# Patient Record
Sex: MALE | Race: ASIAN | NOT HISPANIC OR LATINO | URBAN - METROPOLITAN AREA
[De-identification: names, ages, dates, MRNs, and addresses within clinical notes are randomized per-mention and may not be internally consistent; named-entity substitution may affect disease eponyms.]

---

## 2018-11-28 ENCOUNTER — OFFICE VISIT CONVERTED (OUTPATIENT)
Dept: FAMILY MEDICINE CLINIC | Facility: CLINIC | Age: 53
End: 2018-11-28
Attending: NURSE PRACTITIONER

## 2021-05-16 VITALS
TEMPERATURE: 99.1 F | HEART RATE: 79 BPM | HEIGHT: 70 IN | BODY MASS INDEX: 26.4 KG/M2 | WEIGHT: 184.37 LBS | DIASTOLIC BLOOD PRESSURE: 72 MMHG | SYSTOLIC BLOOD PRESSURE: 116 MMHG | OXYGEN SATURATION: 95 %

## 2025-01-16 ENCOUNTER — APPOINTMENT (OUTPATIENT)
Dept: GENERAL RADIOLOGY | Facility: HOSPITAL | Age: 60
End: 2025-01-16
Payer: OTHER GOVERNMENT

## 2025-01-16 ENCOUNTER — HOSPITAL ENCOUNTER (EMERGENCY)
Facility: HOSPITAL | Age: 60
Discharge: HOME | End: 2025-01-16
Attending: EMERGENCY MEDICINE
Payer: OTHER GOVERNMENT

## 2025-01-16 VITALS
SYSTOLIC BLOOD PRESSURE: 130 MMHG | BODY MASS INDEX: 25.03 KG/M2 | HEIGHT: 70 IN | HEART RATE: 61 BPM | TEMPERATURE: 98 F | DIASTOLIC BLOOD PRESSURE: 80 MMHG | RESPIRATION RATE: 20 BRPM | OXYGEN SATURATION: 99 % | WEIGHT: 174.82 LBS

## 2025-01-16 DIAGNOSIS — R07.89 ATYPICAL CHEST PAIN: ICD-10-CM

## 2025-01-16 DIAGNOSIS — R94.31 ABNORMAL EKG: Primary | ICD-10-CM

## 2025-01-16 LAB
ALBUMIN SERPL-MCNC: 4.5 G/DL (ref 3.5–5.2)
ALBUMIN/GLOB SERPL: 1.9 G/DL
ALP SERPL-CCNC: 50 U/L (ref 39–117)
ALT SERPL W P-5'-P-CCNC: 24 U/L (ref 1–41)
ANION GAP SERPL CALCULATED.3IONS-SCNC: 9.6 MMOL/L (ref 5–15)
AST SERPL-CCNC: 25 U/L (ref 1–40)
BASOPHILS # BLD AUTO: 0.03 10*3/MM3 (ref 0–0.2)
BASOPHILS NFR BLD AUTO: 0.6 % (ref 0–1.5)
BILIRUB SERPL-MCNC: 0.4 MG/DL (ref 0–1.2)
BUN SERPL-MCNC: 18 MG/DL (ref 6–20)
BUN/CREAT SERPL: 17.3 (ref 7–25)
CALCIUM SPEC-SCNC: 9.3 MG/DL (ref 8.6–10.5)
CHLORIDE SERPL-SCNC: 104 MMOL/L (ref 98–107)
CO2 SERPL-SCNC: 29.4 MMOL/L (ref 22–29)
CREAT SERPL-MCNC: 1.04 MG/DL (ref 0.76–1.27)
DEPRECATED RDW RBC AUTO: 40.6 FL (ref 37–54)
EGFRCR SERPLBLD CKD-EPI 2021: 83.2 ML/MIN/1.73
EOSINOPHIL # BLD AUTO: 0.08 10*3/MM3 (ref 0–0.4)
EOSINOPHIL NFR BLD AUTO: 1.6 % (ref 0.3–6.2)
ERYTHROCYTE [DISTWIDTH] IN BLOOD BY AUTOMATED COUNT: 11.7 % (ref 12.3–15.4)
GEN 5 1HR TROPONIN T REFLEX: 7 NG/L
GLOBULIN UR ELPH-MCNC: 2.4 GM/DL
GLUCOSE SERPL-MCNC: 123 MG/DL (ref 65–99)
HCT VFR BLD AUTO: 42.6 % (ref 37.5–51)
HGB BLD-MCNC: 13.9 G/DL (ref 13–17.7)
HOLD SPECIMEN: NORMAL
HOLD SPECIMEN: NORMAL
IMM GRANULOCYTES # BLD AUTO: 0.01 10*3/MM3 (ref 0–0.05)
IMM GRANULOCYTES NFR BLD AUTO: 0.2 % (ref 0–0.5)
LIPASE SERPL-CCNC: 40 U/L (ref 13–60)
LYMPHOCYTES # BLD AUTO: 1.65 10*3/MM3 (ref 0.7–3.1)
LYMPHOCYTES NFR BLD AUTO: 32.7 % (ref 19.6–45.3)
MAGNESIUM SERPL-MCNC: 2.4 MG/DL (ref 1.6–2.6)
MCH RBC QN AUTO: 30.5 PG (ref 26.6–33)
MCHC RBC AUTO-ENTMCNC: 32.6 G/DL (ref 31.5–35.7)
MCV RBC AUTO: 93.6 FL (ref 79–97)
MONOCYTES # BLD AUTO: 0.43 10*3/MM3 (ref 0.1–0.9)
MONOCYTES NFR BLD AUTO: 8.5 % (ref 5–12)
NEUTROPHILS NFR BLD AUTO: 2.85 10*3/MM3 (ref 1.7–7)
NEUTROPHILS NFR BLD AUTO: 56.4 % (ref 42.7–76)
NRBC BLD AUTO-RTO: 0 /100 WBC (ref 0–0.2)
NT-PROBNP SERPL-MCNC: 45.9 PG/ML (ref 0–900)
PLATELET # BLD AUTO: 183 10*3/MM3 (ref 140–450)
PMV BLD AUTO: 10.5 FL (ref 6–12)
POTASSIUM SERPL-SCNC: 4.4 MMOL/L (ref 3.5–5.2)
PROT SERPL-MCNC: 6.9 G/DL (ref 6–8.5)
QT INTERVAL: 424 MS
QT INTERVAL: 477 MS
QTC INTERVAL: 425 MS
QTC INTERVAL: 437 MS
RBC # BLD AUTO: 4.55 10*6/MM3 (ref 4.14–5.8)
SODIUM SERPL-SCNC: 143 MMOL/L (ref 136–145)
TROPONIN T NUMERIC DELTA: -1 NG/L
TROPONIN T SERPL HS-MCNC: 8 NG/L
WBC NRBC COR # BLD AUTO: 5.05 10*3/MM3 (ref 3.4–10.8)
WHOLE BLOOD HOLD COAG: NORMAL
WHOLE BLOOD HOLD SPECIMEN: NORMAL

## 2025-01-16 PROCEDURE — 36415 COLL VENOUS BLD VENIPUNCTURE: CPT | Performed by: EMERGENCY MEDICINE

## 2025-01-16 PROCEDURE — 83690 ASSAY OF LIPASE: CPT | Performed by: EMERGENCY MEDICINE

## 2025-01-16 PROCEDURE — 93005 ELECTROCARDIOGRAM TRACING: CPT

## 2025-01-16 PROCEDURE — 99284 EMERGENCY DEPT VISIT MOD MDM: CPT

## 2025-01-16 PROCEDURE — 83735 ASSAY OF MAGNESIUM: CPT | Performed by: EMERGENCY MEDICINE

## 2025-01-16 PROCEDURE — 93005 ELECTROCARDIOGRAM TRACING: CPT | Performed by: EMERGENCY MEDICINE

## 2025-01-16 PROCEDURE — 80053 COMPREHEN METABOLIC PANEL: CPT | Performed by: EMERGENCY MEDICINE

## 2025-01-16 PROCEDURE — 84484 ASSAY OF TROPONIN QUANT: CPT | Performed by: EMERGENCY MEDICINE

## 2025-01-16 PROCEDURE — 83880 ASSAY OF NATRIURETIC PEPTIDE: CPT | Performed by: EMERGENCY MEDICINE

## 2025-01-16 PROCEDURE — 71045 X-RAY EXAM CHEST 1 VIEW: CPT

## 2025-01-16 PROCEDURE — 85025 COMPLETE CBC W/AUTO DIFF WBC: CPT | Performed by: EMERGENCY MEDICINE

## 2025-01-16 RX ORDER — SODIUM CHLORIDE 0.9 % (FLUSH) 0.9 %
10 SYRINGE (ML) INJECTION AS NEEDED
Status: DISCONTINUED | OUTPATIENT
Start: 2025-01-16 | End: 2025-01-16

## 2025-01-16 RX ORDER — ASPIRIN 81 MG/1
324 TABLET, CHEWABLE ORAL ONCE
Status: DISCONTINUED | OUTPATIENT
Start: 2025-01-16 | End: 2025-01-16

## 2025-01-17 NOTE — ED PROVIDER NOTES
Time: 7:01 PM EST  Date of encounter:  1/16/2025  Independent Historian/Clinical History and Information was obtained by:   Patient    History is limited by: N/A    Chief Complaint   Patient presents with    Chest Pain         History of Present Illness:  Patient is a 59 y.o. year old male who presents to the emergency department for evaluation of intermittent chest pain that has been going on all day.  He states that he has had some slight dizziness.  He denies shortness of breath, nausea, diaphoresis.       Patient Care Team  Primary Care Provider: Jackelyn Pedersen MD    Past Medical History:     Allergies   Allergen Reactions    Sulfamethoxazole-Trimethoprim Other (See Comments)     Pt states he turns red      No past medical history on file.  Past Surgical History:   Procedure Laterality Date    HERNIA REPAIR  2009    JOINT REPLACEMENT      Hips     No family history on file.    Home Medications:  Prior to Admission medications    Medication Sig Start Date End Date Taking? Authorizing Provider   aspirin 81 MG EC tablet aspirin 81 mg oral tablet,delayed release (DR/EC) take 1 tablet (81 mg) by oral route once daily   Active    ProviderKimberlee MD   atorvastatin (LIPITOR) 20 MG tablet  5/31/24   ProviderKimberlee MD   Glucosamine Sulfate 500 MG tablet Glucosamine 500 mg oral tablet take 3 tablets by oral route daily   Suspended    ProviderKimberlee MD   methylPREDNISolone (MEDROL) 4 MG dose pack Take as directed on package instructions.  Start 8/29/2024. 8/28/24   Nadira Gaytan APRN        Social History:   Social History     Tobacco Use    Smoking status: Never     Passive exposure: Never    Smokeless tobacco: Never   Vaping Use    Vaping status: Never Used   Substance Use Topics    Alcohol use: Not Currently         Review of Systems:  Review of Systems   Cardiovascular:  Positive for chest pain.        Physical Exam:  /80 (BP Location: Left arm, Patient Position: Sitting)   Pulse  "61   Temp 98 °F (36.7 °C) (Oral)   Resp 20   Ht 177.8 cm (70\")   Wt 79.3 kg (174 lb 13.2 oz)   SpO2 99%   BMI 25.08 kg/m²         Physical Exam  Vitals and nursing note reviewed.   Constitutional:       Appearance: Normal appearance.   HENT:      Head: Normocephalic.   Eyes:      Extraocular Movements: Extraocular movements intact.      Conjunctiva/sclera: Conjunctivae normal.   Cardiovascular:      Rate and Rhythm: Regular rhythm. Bradycardia present.      Heart sounds: Murmur heard.   Pulmonary:      Effort: Pulmonary effort is normal.   Abdominal:      General: There is no distension.   Skin:     General: Skin is warm.      Coloration: Skin is not cyanotic.   Neurological:      Mental Status: He is alert and oriented to person, place, and time.   Psychiatric:         Attention and Perception: Attention and perception normal.         Mood and Affect: Mood normal.                            Medical Decision Making:      Comorbidities that affect care:    None    External Notes reviewed:    Previous Clinic Note: Previous clinic note on 8/28/2024 reviewed      The following orders were placed and all results were independently analyzed by me:  Orders Placed This Encounter   Procedures    XR Chest 1 View    [place] Draw    High Sensitivity Troponin T    Comprehensive Metabolic Panel    Lipase    BNP    Magnesium    CBC Auto Differential    High Sensitivity Troponin T 1Hr    Ambulatory Referral to Cardiology    Continuous Pulse Oximetry    ECG 12 Lead Chest Pain    ECG 12 Lead Chest Pain    ECG 12 Lead    CBC & Differential    Green Top (Gel)    Lavender Top    Gold Top - SST    Light Blue Top       Medications Given in the Emergency Department:  Medications - No data to display     ED Course:    The patient was initially evaluated in the triage area where orders were placed. The patient was later dispositioned by Zach Stiles PA-C.      The patient was advised to stay for completion of workup which " includes but is not limited to communication of labs and radiological results, reassessment and plan. The patient was advised that leaving prior to disposition by a provider could result in critical findings that are not communicated to the patient.     ED Course as of 01/17/25 0201   u Jan 16, 2025 2233 XR Chest 1 View [CB]      ED Course User Index  [CB] Zach Stiles, ANAIS       Labs:    Lab Results (last 24 hours)       Procedure Component Value Units Date/Time    High Sensitivity Troponin T [[id number]]  (Normal) Collected: 01/16/25 1907    Specimen: Blood from Arm, Right Updated: 01/16/25 1941     HS Troponin T 8 ng/L     Narrative:      High Sensitive Troponin T Reference Range:  <14.0 ng/L- Negative Female for AMI  <22.0 ng/L- Negative Male for AMI  >=14 - Abnormal Female indicating possible myocardial injury.  >=22 - Abnormal Male indicating possible myocardial injury.   Clinicians would have to utilize clinical acumen, EKG, Troponin, and serial changes to determine if it is an Acute Myocardial Infarction or myocardial injury due to an underlying chronic condition.         CBC & Differential [[id number]]  (Abnormal) Collected: 01/16/25 1907    Specimen: Blood from Arm, Right Updated: 01/16/25 1913    Narrative:      The following orders were created for panel order CBC & Differential.  Procedure                               Abnormality         Status                     ---------                               -----------         ------                     CBC Auto Differential[[id number]]        Abnormal            Final result                 Please view results for these tests on the individual orders.    Comprehensive Metabolic Panel [[id number]]  (Abnormal) Collected: 01/16/25 1907    Specimen: Blood from Arm, Right Updated: 01/16/25 1942     Glucose 123 mg/dL      BUN 18 mg/dL      Creatinine 1.04 mg/dL      Sodium 143 mmol/L      Potassium 4.4 mmol/L      Chloride 104 mmol/L      CO2 29.4 mmol/L       Calcium 9.3 mg/dL      Total Protein 6.9 g/dL      Albumin 4.5 g/dL      ALT (SGPT) 24 U/L      AST (SGOT) 25 U/L      Alkaline Phosphatase 50 U/L      Total Bilirubin 0.4 mg/dL      Globulin 2.4 gm/dL      A/G Ratio 1.9 g/dL      BUN/Creatinine Ratio 17.3     Anion Gap 9.6 mmol/L      eGFR 83.2 mL/min/1.73     Narrative:      GFR Categories in Chronic Kidney Disease (CKD)      GFR Category          GFR (mL/min/1.73)    Interpretation  G1                     90 or greater         Normal or high (1)  G2                      60-89                Mild decrease (1)  G3a                   45-59                Mild to moderate decrease  G3b                   30-44                Moderate to severe decrease  G4                    15-29                Severe decrease  G5                    14 or less           Kidney failure          (1)In the absence of evidence of kidney disease, neither GFR category G1 or G2 fulfill the criteria for CKD.    eGFR calculation 2021 CKD-EPI creatinine equation, which does not include race as a factor    Lipase [[id number]]  (Normal) Collected: 01/16/25 1907    Specimen: Blood from Arm, Right Updated: 01/16/25 1941     Lipase 40 U/L     BNP [[id number]]  (Normal) Collected: 01/16/25 1907    Specimen: Blood from Arm, Right Updated: 01/16/25 1937     proBNP 45.9 pg/mL     Narrative:      This assay is used as an aid in the diagnosis of individuals suspected of having heart failure. It can be used as an aid in the diagnosis of acute decompensated heart failure (ADHF) in patients presenting with signs and symptoms of ADHF to the emergency department (ED). In addition, NT-proBNP of <300 pg/mL indicates ADHF is not likely.    Age Range Result Interpretation  NT-proBNP Concentration (pg/mL:      <50             Positive            >450                   Gray                 300-450                    Negative             <300    50-75           Positive            >900                  [place]                 300-900                  Negative            <300      >75             Positive            >1800                  Gray                300-1800                  Negative            <300    Magnesium [[id number]]  (Normal) Collected: 01/16/25 1907    Specimen: Blood from Arm, Right Updated: 01/16/25 1942     Magnesium 2.4 mg/dL     CBC Auto Differential [[id number]]  (Abnormal) Collected: 01/16/25 1907    Specimen: Blood from Arm, Right Updated: 01/16/25 1913     WBC 5.05 10*3/mm3      RBC 4.55 10*6/mm3      Hemoglobin 13.9 g/dL      Hematocrit 42.6 %      MCV 93.6 fL      MCH 30.5 pg      MCHC 32.6 g/dL      RDW 11.7 %      RDW-SD 40.6 fl      MPV 10.5 fL      Platelets 183 10*3/mm3      Neutrophil % 56.4 %      Lymphocyte % 32.7 %      Monocyte % 8.5 %      Eosinophil % 1.6 %      Basophil % 0.6 %      Immature Grans % 0.2 %      Neutrophils, Absolute 2.85 10*3/mm3      Lymphocytes, Absolute 1.65 10*3/mm3      Monocytes, Absolute 0.43 10*3/mm3      Eosinophils, Absolute 0.08 10*3/mm3      Basophils, Absolute 0.03 10*3/mm3      Immature Grans, Absolute 0.01 10*3/mm3      nRBC 0.0 /100 WBC     High Sensitivity Troponin T 1Hr [[id number]]  (Normal) Collected: 01/16/25 2048    Specimen: Blood from Arm, Right Updated: 01/16/25 2123     HS Troponin T 7 ng/L      Troponin T Numeric Delta -1 ng/L     Narrative:      High Sensitive Troponin T Reference Range:  <14.0 ng/L- Negative Female for AMI  <22.0 ng/L- Negative Male for AMI  >=14 - Abnormal Female indicating possible myocardial injury.  >=22 - Abnormal Male indicating possible myocardial injury.   Clinicians would have to utilize clinical acumen, EKG, Troponin, and serial changes to determine if it is an Acute Myocardial Infarction or myocardial injury due to an underlying chronic condition.                  Imaging:    XR Chest 1 View    Result Date: 1/16/2025  XR CHEST 1 VW Date of Exam: 1/16/2025 7:45 PM EST Indication: Chest Pain Triage Protocol Comparison:  None available. Findings: Mediastinum: Cardiac silhouette appears normal in size Lungs: The lungs appear clear without focal consolidation appreciated. Pleura: No pleural effusion or pneumothorax. Bones and soft tissues: No acute, displaced fracture seen.     Impression: No radiographic evidence of acute cardiopulmonary abnormality. Electronically Signed: Alvin Bauman  1/16/2025 8:24 PM EST  Workstation ID: [id number]       Differential Diagnosis and Discussion:      Chest Pain:  Based on the patient's signs and symptoms, I considered aortic dissection, myocardial infaction, pulmonary embolism, cardiac tamponade, pericarditis, pneumothorax, musculoskeletal chest pain and other differential diagnosis as an etiology of the patient's chest pain.     PROCEDURES:    Labs were collected in the emergency department and all labs were reviewed and interpreted by me.  An EKG was performed and the EKG was interpreted by me.    ECG 12 Lead Chest Pain   Preliminary Result   HEART RATE=50  bpm   RR Jxumihkz=9208  ms   NE Interval=66  ms   P Horizontal Axis=  deg   P Front Axis=45  deg   QRSD Wlkhcohm=836  ms   QT Wbzotufq=403  ms   LTlG=469  ms   QRS Axis=-5  deg   T Wave Axis=8  deg   - ABNORMAL ECG -   Sinus rhythm   Short NE interval   ST elevation, consider anterior injury   Date and Time of Study:2025-01-16 23:01:16      ECG 12 Lead Chest Pain   Preliminary Result   HEART RATE=60  bpm   RR Rhzvkpks=057  ms   NE Nitxplnk=826  ms   P Horizontal Axis=-14  deg   P Front Axis=60  deg   QRSD Yyviwilq=028  ms   QT Kztydmnj=689  ms   NTkT=999  ms   QRS Axis=-2  deg   T Wave Axis=39  deg   - ABNORMAL ECG -   Sinus rhythm   Probable left atrial enlargement   Anteroseptal infarct, age indeterminate   Date and Time of Study:2025-01-16 18:58:15      ECG 12 Lead    (Results Pending)        ECG 12 Lead      Date/Time: 1/17/2025 1:57 AM    Performed by: Zach Stiles PA-C  Authorized by: Ezio Troy DO  Previous ECG: no  previous ECG available  Rhythm: sinus rhythm  Rate: bradycardic  BPM: 50  QRS axis: normal  Conduction: conduction normal  ST Elevation: V1 and V2  Other: no other findings  Clinical impression: non-specific ECG  Comments: Possible artifact in initial and repeat EKG in V1 V2.  Evidence of ST segment elevation with patient longstanding history of atypical chest pain.        MDM  The patient is resting comfortably and feels better, is alert and in no distress. The repeat examination is unremarkable and benign. Electrocardiogram shows no signs of acute ischemia and the history, exam, diagnostic testing and current condition did not suggest that this patient is having an acute myocardial infarction, significant arrhythmia, unstable angina, esophageal perforation, pulmonary embolism, aortic dissection, severe pneumonia, sepsis for other significant pathology that would warrant further testing, continued ED treatment, admission, cardiology or other specialist consultation at this point. The vital signs have been stable. The patient's condition is stable and appropriate for discharge. The patient will pursue further outpatient evaluation with the primary care physician, or designated physician or cardiologist. The patient has expressed a clear and thorough understanding and agreed to follow-up as instructed.                Patient Care Considerations:    SEPSIS was considered but is NOT present in the emergency department as SIRS criteria is not present. ANTIBIOTICS: I considered prescribing antibiotics as an outpatient however no bacterial focus of infection was found.      Consultants/Shared Management Plan:    None    Social Determinants of Health:    Patient is independent, reliable, and has access to care.       Disposition and Care Coordination:    Discharged: I considered escalation of care by admitting this patient to the hospital, however patient chest pain resolved prior to ED bedding.  No acute ischemic changes  found on EKG    I have explained the patient´s condition, diagnoses and treatment plan based on the information available to me at this time. I have answered questions and addressed any concerns. The patient has a good  understanding of the patient´s diagnosis, condition, and treatment plan as can be expected at this point. The vital signs have been stable. The patient´s condition is stable and appropriate for discharge from the emergency department.      The patient will pursue further outpatient evaluation with the primary care physician or other designated or consulting physician as outlined in the discharge instructions. They are agreeable to this plan of care and follow-up instructions have been explained in detail. The patient has received these instructions in written format and has expressed an understanding of the discharge instructions. The patient is aware that any significant change in condition or worsening of symptoms should prompt an immediate return to this or the closest emergency department or call to 911.  I have explained discharge medications and the need for follow up with the patient/caretakers. This was also printed in the discharge instructions. Patient was discharged with the following medications and follow up:      Medication List      No changes were made to your prescriptions during this visit.      Jackelyn Pedersen MD  [address] [facility] [address] [phone]    Schedule an appointment as soon as possible for a visit       AMARILIS Samaniego MD  [address] [place] [id number] [phone]    Schedule an appointment as soon as possible for a visit          Final diagnoses:   Abnormal EKG   Atypical chest pain        ED Disposition       ED Disposition   Discharge    Condition   Stable    Comment   --               This medical record created using voice recognition software.             Zach Stiles PA-C  01/17/25 0205

## 2025-01-17 NOTE — DISCHARGE INSTRUCTIONS
Thank you for allowing us to provide care for you today.  Your workup today revealed evidence of atypical chest pain.  As discussed, I will have you follow-up with your PCP in the next 7 days along with an ambulatory follow-up referral with [facility] cardiology for repeat EKG and assessment of your atypical chest pain. Thank you

## 2025-01-30 LAB
QT INTERVAL: 424 MS
QT INTERVAL: 477 MS
QTC INTERVAL: 425 MS
QTC INTERVAL: 437 MS